# Patient Record
Sex: FEMALE | Race: WHITE | NOT HISPANIC OR LATINO | URBAN - METROPOLITAN AREA
[De-identification: names, ages, dates, MRNs, and addresses within clinical notes are randomized per-mention and may not be internally consistent; named-entity substitution may affect disease eponyms.]

---

## 2023-02-13 ENCOUNTER — OFFICE VISIT (OUTPATIENT)
Dept: FAMILY MEDICINE CLINIC | Facility: CLINIC | Age: 28
End: 2023-02-13

## 2023-02-13 VITALS
BODY MASS INDEX: 19.91 KG/M2 | TEMPERATURE: 98.2 F | WEIGHT: 131.4 LBS | HEART RATE: 55 BPM | OXYGEN SATURATION: 100 % | HEIGHT: 68 IN | SYSTOLIC BLOOD PRESSURE: 119 MMHG | DIASTOLIC BLOOD PRESSURE: 58 MMHG

## 2023-02-13 DIAGNOSIS — Z13.0 SCREENING FOR DEFICIENCY ANEMIA: ICD-10-CM

## 2023-02-13 DIAGNOSIS — Z13.21 ENCOUNTER FOR VITAMIN DEFICIENCY SCREENING: ICD-10-CM

## 2023-02-13 DIAGNOSIS — Z00.00 ANNUAL PHYSICAL EXAM: Primary | ICD-10-CM

## 2023-02-13 DIAGNOSIS — Z13.6 SCREENING FOR CARDIOVASCULAR CONDITION: ICD-10-CM

## 2023-02-13 DIAGNOSIS — Z13.1 SCREENING FOR DIABETES MELLITUS: ICD-10-CM

## 2023-02-13 NOTE — PROGRESS NOTES
[facility] PRIMARY CARE    NAME: Katey Caballero  AGE: 28 y o  SEX: female  : 1995     DATE: 2023     Assessment and Plan:     Problem List Items Addressed This Visit    None  Visit Diagnoses     Annual physical exam    -  Primary    Screening for cardiovascular condition        Relevant Orders    Lipid panel    Screening for diabetes mellitus        Relevant Orders    Comprehensive metabolic panel    Encounter for vitamin deficiency screening        Relevant Orders    Vitamin D 25 hydroxy    Screening for deficiency anemia        Relevant Orders    CBC and differential          Immunizations and preventive care screenings were discussed with patient today  Appropriate education was printed on patient's after visit summary  Counseling:  Alcohol/drug use: discussed moderation in alcohol intake, the recommendations for healthy alcohol use, and avoidance of illicit drug use  Dental Health: discussed importance of regular tooth brushing, flossing, and dental visits  Injury prevention: discussed safety/seat belts, safety helmets, smoke detectors, carbon dioxide detectors, and smoking near bedding or upholstery  Sexual health: discussed sexually transmitted diseases, partner selection, use of condoms, avoidance of unintended pregnancy, and contraceptive alternatives  Exercise: the importance of regular exercise/physical activity was discussed  Recommend exercise 3-5 times per week for at least 30 minutes  No follow-ups on file  Chief Complaint:     Chief Complaint   Patient presents with   • Establish Care     Patient is in the office to Establish Care with Dr Edgardo Arana  No viewable records  Stated concerns - none; new to [place] from [address]  [facility] since 10/2022  Flu shot - completed - 10/2022  PAP - last  -  [address]  - normal findings  Care gap ms to be sent     HIV & HEPC - declined  Lab - pt unsure History of Present Illness:     Adult Annual Physical   Patient here for a comprehensive physical exam  The patient reports no problems  Recently moved to the area from [address]  Is from [facility]  Did undergrad at [employer]  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[facility]  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 working at [employer]  Had labs done in past and cholesterol was reportedly normal  Father has familial hyperlipidemia  Had adacel prior to pharmacy school and had HPV vaccines  Will bring copy of records for me to review  Had pap in 2021  Was reportedly normal  Will obtain records  Diet and Physical Activity  Diet/Nutrition: consuming 3-5 servings of fruits/vegetables daily and does not drink laina  She does not eat red meat at all  Eats fish  Exercise: walks her [place] 5-6 miles per day  she also goes to [employer]  Depression Screening  PHQ-2/9 Depression Screening    Little interest or pleasure in doing things: 0 - not at all  Feeling down, depressed, or hopeless: 0 - not at all  PHQ-2 Score: 0  PHQ-2 Interpretation: Negative depression screen       General Health  Sleep: sleeps well  Hearing: normal - bilateral   Vision: no vision problems  Dental: no dental visits for >1 year  /GYN Health  Last menstrual period: 1/6/223  Her cycles are 37 to 45 days in length  Contraceptive method: condoms    History of STDs?: no      Review of Systems:     Review of Systems   Past Medical History:     Past Medical History:   Diagnosis Date   • PCOS (polycystic ovarian syndrome) 2010      Past Surgical History:     Past Surgical History:   Procedure Laterality Date   • ACHILLES TENDON REPAIR Left 2012   • ARTHROSCOPIC REPAIR ACL Left 2013      Social History:     Social History     Socioeconomic History   • Marital status: /Civil Union     Spouse name: None   • Number of children: None   • Years of education: None   • Highest education level: None   Occupational History   • None   Tobacco Use   • Smoking status: Never   • Smokeless tobacco: Never   Vaping Use   • Vaping Use: Never used   Substance and Sexual Activity   • Alcohol use: Not Currently     Comment: rarely consume alcohol   • Drug use: Never   • Sexual activity: Yes     Partners: Male     Birth control/protection: Condom Male   Other Topics Concern   • None   Social History Narrative        Has a dog  She is currently doing post graduate work at [employer] and working at [employer]  Is [facility] born and did undergrad at [employer], pharmacy school at [address] Strain: Not on file   Food Insecurity: Not on file   Transportation Needs: Not on file   Physical Activity: Not on file   Stress: Not on file   Social Connections: Not on file   Intimate Partner Violence: Not on file   Housing Stability: Not on file      Family History:     Family History   Problem Relation Age of Onset   • Asthma Mother    • COPD Mother    • Hypertension Father    • Hyperlipidemia Father    • Breast cancer Maternal Grandmother    • Breast cancer Paternal Grandmother       Current Medications:     No current outpatient medications on file  No current facility-administered medications for this visit  Allergies:     No Known Allergies   Physical Exam:     /58 (BP Location: Left arm, Patient Position: Sitting, Cuff Size: Standard)   Pulse 55 Comment: WNL per pt  Temp 98 2 °F (36 8 °C) (Tympanic)   Ht 5' 7 5" (1 715 m)   Wt 59 6 kg (131 lb 6 4 oz)   SpO2 100%   BMI 20 28 kg/m²     Physical Exam  Vitals and nursing note reviewed  Constitutional:       General: She is not in acute distress  Appearance: Normal appearance  She is not ill-appearing, toxic-appearing or diaphoretic  HENT:      Head: Normocephalic and atraumatic        Right Ear: Tympanic membrane normal       Left Ear: Tympanic membrane normal       Nose: Nose normal       Mouth/Throat:      Mouth: Mucous membranes are moist       Pharynx: No oropharyngeal exudate or posterior oropharyngeal erythema  Eyes:      Conjunctiva/sclera: Conjunctivae normal       Pupils: Pupils are equal, round, and reactive to light  Cardiovascular:      Rate and Rhythm: Normal rate and regular rhythm  Heart sounds: No murmur heard  Pulmonary:      Effort: Pulmonary effort is normal       Breath sounds: Normal breath sounds  Abdominal:      General: Abdomen is flat  Bowel sounds are normal  There is no distension  Palpations: Abdomen is soft  There is no mass  Tenderness: There is no abdominal tenderness  Musculoskeletal:         General: Normal range of motion  Cervical back: Normal range of motion  Right lower leg: No edema  Left lower leg: No edema  Lymphadenopathy:      Cervical: No cervical adenopathy  Skin:     General: Skin is warm and dry  Findings: No lesion or rash  Neurological:      General: No focal deficit present  Mental Status: She is alert and oriented to person, place, and time     Psychiatric:         Mood and Affect: Mood normal           Olya Contreras DO   [facility] PRIMARY CARE

## 2023-02-13 NOTE — PATIENT INSTRUCTIONS

## 2023-02-14 ENCOUNTER — TELEPHONE (OUTPATIENT)
Dept: ADMINISTRATIVE | Facility: OTHER | Age: 28
End: 2023-02-14

## 2023-02-14 NOTE — LETTER
Procedure Request Form: Cervical Cancer Screening      Date Requested: 02/15/23  Patient: Arjun Isaac  Patient : 1995   Referring Provider: Electa Avinash, DO        Date of Procedure ______________________________       The above patient has informed us that they have completed their   most recent Cervical Cancer Screening at your facility  Please complete   this form and attach all corresponding procedure reports/results  Comments __________________________________________________________  ____________________________________________________________________  ____________________________________________________________________  ____________________________________________________________________    Facility Completing Procedure _________________________________________    Form Completed By (print name) _______________________________________      Signature __________________________________________________________      These reports are needed for  compliance  Please fax this completed form and a copy of the procedure report to our office located at [place] [id number] as soon as possible to Fax [phone] laure Henriquez: Phone [phone]    We thank you for your assistance in treating our mutual patient

## 2023-02-14 NOTE — LETTER
Procedure Request Form: Cervical Cancer Screening      Date Requested: 23  Patient: Jackie Araujoer  Patient : 1995   Referring Provider: Marie Thomas, DO        Date of Procedure ______________________________       The above patient has informed us that they have completed their   most recent Cervical Cancer Screening at your facility  Please complete   this form and attach all corresponding procedure reports/results  Comments __________________________________________________________  ____________________________________________________________________  ____________________________________________________________________  ____________________________________________________________________    Facility Completing Procedure _________________________________________    Form Completed By (print name) _______________________________________      Signature __________________________________________________________      These reports are needed for  compliance  Please fax this completed form and a copy of the procedure report to our office located at [place] [id number] as soon as possible to Fax [phone] laure Isidro : Phone [phone]    We thank you for your assistance in treating our mutual patient

## 2023-02-14 NOTE — TELEPHONE ENCOUNTER
----- Message from Enid Hui sent at 2/13/2023  1:43 PM EST -----  Regarding: care gap request  02/13/23 1:43 PM    Hello, our patient attached above has had Pap Smear (HPV) aka Cervical Cancer Screening completed/performed  Please assist in updating the patient chart by making an External outreach to [address] [place] [address] located in [address]   The date of service is 2021    Thank you,  Enid Hui  [facility] [address]

## 2023-02-14 NOTE — LETTER
Procedure Request Form: Cervical Cancer Screening      Date Requested: 23  Patient: Elmira Tamayo  Patient : 1995   Referring Provider: Deepali Rater, DO        Date of Procedure ______________________________       The above patient has informed us that they have completed their   most recent Cervical Cancer Screening at your facility  Please complete   this form and attach all corresponding procedure reports/results  Comments __________________________________________________________  ____________________________________________________________________  ____________________________________________________________________  ____________________________________________________________________    Facility Completing Procedure _________________________________________    Form Completed By (print name) _______________________________________      Signature __________________________________________________________      These reports are needed for  compliance  Please fax this completed form and a copy of the procedure report to our office located at [place] [phone] as soon as possible to Fax [phone] laure Andrade: Phone [phone]    We thank you for your assistance in treating our mutual patient

## 2023-02-15 NOTE — TELEPHONE ENCOUNTER
Upon review of the In Basket request and the patient's chart, initial outreach has been made via fax to facility  Please see Contacts section for details       Thank you  Mckenzie Vann

## 2023-02-20 NOTE — TELEPHONE ENCOUNTER
As a follow-up, a second attempt has been made for outreach via fax to facility  Please see Contacts section for details      Thank you  Reta Giles

## 2023-02-27 NOTE — TELEPHONE ENCOUNTER
As a final attempt, a third outreach has been made via telephone call to facility  The outcome of the telephone call was a fax request form to be sent to Office/Facility  Please see Contacts section for details  This encounter will be closed and completed by end of day  Should we receive the requested information because of previous outreach attempts, the requested patient's chart will be updated appropriately       Thank you  Reta Giles

## 2023-02-28 ENCOUNTER — APPOINTMENT (OUTPATIENT)
Dept: LAB | Facility: HOSPITAL | Age: 28
End: 2023-02-28

## 2023-02-28 DIAGNOSIS — Z13.0 SCREENING FOR DEFICIENCY ANEMIA: ICD-10-CM

## 2023-02-28 DIAGNOSIS — Z13.6 SCREENING FOR CARDIOVASCULAR CONDITION: ICD-10-CM

## 2023-02-28 DIAGNOSIS — Z13.21 ENCOUNTER FOR VITAMIN DEFICIENCY SCREENING: ICD-10-CM

## 2023-02-28 DIAGNOSIS — Z13.1 SCREENING FOR DIABETES MELLITUS: ICD-10-CM

## 2023-02-28 LAB
25(OH)D3 SERPL-MCNC: 29.2 NG/ML (ref 30–100)
ALBUMIN SERPL BCP-MCNC: 4.2 G/DL (ref 3.5–5)
ALP SERPL-CCNC: 38 U/L (ref 46–116)
ALT SERPL W P-5'-P-CCNC: 18 U/L (ref 12–78)
ANION GAP SERPL CALCULATED.3IONS-SCNC: 3 MMOL/L (ref 4–13)
AST SERPL W P-5'-P-CCNC: 15 U/L (ref 5–45)
BASOPHILS # BLD AUTO: 0.03 THOUSANDS/ÂΜL (ref 0–0.1)
BASOPHILS NFR BLD AUTO: 1 % (ref 0–1)
BILIRUB SERPL-MCNC: 1.08 MG/DL (ref 0.2–1)
BUN SERPL-MCNC: 20 MG/DL (ref 5–25)
CALCIUM SERPL-MCNC: 9.2 MG/DL (ref 8.3–10.1)
CHLORIDE SERPL-SCNC: 107 MMOL/L (ref 96–108)
CHOLEST SERPL-MCNC: 130 MG/DL
CO2 SERPL-SCNC: 27 MMOL/L (ref 21–32)
CREAT SERPL-MCNC: 0.78 MG/DL (ref 0.6–1.3)
EOSINOPHIL # BLD AUTO: 0.07 THOUSAND/ÂΜL (ref 0–0.61)
EOSINOPHIL NFR BLD AUTO: 2 % (ref 0–6)
ERYTHROCYTE [DISTWIDTH] IN BLOOD BY AUTOMATED COUNT: 12 % (ref 11.6–15.1)
GFR SERPL CREATININE-BSD FRML MDRD: 103 ML/MIN/1.73SQ M
GLUCOSE P FAST SERPL-MCNC: 87 MG/DL (ref 65–99)
HCT VFR BLD AUTO: 38.7 % (ref 34.8–46.1)
HDLC SERPL-MCNC: 51 MG/DL
HGB BLD-MCNC: 13.1 G/DL (ref 11.5–15.4)
IMM GRANULOCYTES # BLD AUTO: 0 THOUSAND/UL (ref 0–0.2)
IMM GRANULOCYTES NFR BLD AUTO: 0 % (ref 0–2)
LDLC SERPL CALC-MCNC: 71 MG/DL (ref 0–100)
LYMPHOCYTES # BLD AUTO: 1.47 THOUSANDS/ÂΜL (ref 0.6–4.47)
LYMPHOCYTES NFR BLD AUTO: 38 % (ref 14–44)
MCH RBC QN AUTO: 30 PG (ref 26.8–34.3)
MCHC RBC AUTO-ENTMCNC: 33.9 G/DL (ref 31.4–37.4)
MCV RBC AUTO: 89 FL (ref 82–98)
MONOCYTES # BLD AUTO: 0.35 THOUSAND/ÂΜL (ref 0.17–1.22)
MONOCYTES NFR BLD AUTO: 9 % (ref 4–12)
NEUTROPHILS # BLD AUTO: 2 THOUSANDS/ÂΜL (ref 1.85–7.62)
NEUTS SEG NFR BLD AUTO: 50 % (ref 43–75)
NONHDLC SERPL-MCNC: 79 MG/DL
NRBC BLD AUTO-RTO: 0 /100 WBCS
PLATELET # BLD AUTO: 191 THOUSANDS/UL (ref 149–390)
PMV BLD AUTO: 11 FL (ref 8.9–12.7)
POTASSIUM SERPL-SCNC: 4 MMOL/L (ref 3.5–5.3)
PROT SERPL-MCNC: 7.1 G/DL (ref 6.4–8.4)
RBC # BLD AUTO: 4.36 MILLION/UL (ref 3.81–5.12)
SODIUM SERPL-SCNC: 137 MMOL/L (ref 135–147)
TRIGL SERPL-MCNC: 39 MG/DL
WBC # BLD AUTO: 3.92 THOUSAND/UL (ref 4.31–10.16)

## 2023-04-03 PROBLEM — E55.9 VITAMIN D DEFICIENCY: Status: ACTIVE | Noted: 2023-04-03

## 2024-02-16 NOTE — PROGRESS NOTES
ADULT ANNUAL PHYSICAL  [facility] PRIMARY CARE    NAME: Symone Moy  AGE: 28 y.o. 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 Was to bring copy of these for her chart. Have not received.   Screening labs done last year showed elevated alk phos as well as low vitamin D.   Will check lipid panel and glucose    Leukopenia, unspecified type        Low serum alkaline phosphatase        Relevant Orders    PTH, intact    Vitamin D 25 hydroxy    Magnesium    Vitamin B6    TSH, 3rd generation with Free T4 reflex    Celiac Disease Antibody Profile    Zinc    Phosphorus  Discussed potential etiologies of low alk phos including hypophosphatasia, malnutrition, zinc deficiency,  She has no sx of hypophosphatasia (fracture prior to 17, bone pain, tooth decay, short stature).   Will repeat alk phos and check some additional labs as noted above.     Screening for cervical cancer        Relevant Orders    Ambulatory referral to Obstetrics / Gynecology  Due for pap  Prefers to see gyn to establish care in case she is interested in starting family in near future.     Amenorrhea        Relevant Orders    Pregnancy Test (HCG Qualitative)  Likely related to her PCOS  Check beta hcg      Screening for diabetes mellitus        Relevant Orders    Comprehensive metabolic panel    Screening for cardiovascular condition        Relevant Orders    Lipid panel              Immunizations and preventive care screenings were discussed with patient today. Appropriate education was printed on patient's after visit summary.    Counseling:  Alcohol/drug use: discussed  moderation in alcohol intake, the recommendations for healthy alcohol use, and avoidance of illicit drug use.  Dental Health: discussed importance of regular tooth brushing, flossing, and dental visits.  Injury prevention: discussed safety/seat belts, safety helmets, smoke detectors, carbon dioxide detectors, and smoking near bedding or upholstery.  Sexual health: discussed sexually transmitted diseases, partner selection, use of condoms, avoidance of unintended pregnancy, and contraceptive alternatives.  Exercise: the importance of regular exercise/physical activity was discussed. Recommend exercise 3-5 times per week for at least 30 minutes.          No follow-ups on file.     Chief Complaint:     Chief Complaint   Patient presents with   • Annual Exam     Has not had a menstrual period since December, stated she is not pregnant, she took a pregnancy test, was negative. Pt believes it is due to her being more stress than usual      History of Present Illness:     Adult Annual Physical   Patient here for a comprehensive physical exam.   Her last pap was completed while in [place] in 2021. Never received those records.   Reportedly received her Adacel while in pharmacy school. We never received those records.   Had screening labs at time of last years' PE. Her alk phos was slightly low, vitamin D was slightly low and her wbc was low at 3.92  Repeat labs were never completed.     Her appetite is the same. Ran [employer] in November of 2023. After that took a bit of a break. Has really not got back into her exercise routine.   FDP 12/30/24.   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 Thinks stress is contributing to her missed periods.   She is sexually active. Uses condoms. Did do pregnancy test at home and it was negative.   Overdue for pap and wants to see gyn to establish in case she wants to start a family soon.     ?      The patient reports  stress .    Diet and Physical  Activity  Diet/Nutrition: well balanced diet.   Exercise:  runs, does orange theory and yoga. Has not exercising as much since her marathon .      Depression Screening  PHQ-2/9 Depression Screening    Little interest or pleasure in doing things: 0 - not at all  Feeling down, depressed, or hopeless: 0 - not at all  PHQ-2 Score: 0  PHQ-2 Interpretation: Negative depression screen       General Health  Sleep: sleeps well.   Hearing: normal - bilateral.  Vision: no vision problems.   Dental: regular dental visits.       /GYN Health  Follows with gynecology? no   Last menstrual period: 12/30/23  Contraceptive method:  none .  History of STDs?: no.     Advanced Care Planning  Do you have an advanced directive? no  Do you have a durable medical power of ? no  ACP document given to the patient? no      Review of Systems:     Review of Systems   Past Medical History:     Past Medical History:   Diagnosis Date   • PCOS (polycystic ovarian syndrome) 2010      Past Surgical History:     Past Surgical History:   Procedure Laterality Date   • ACHILLES TENDON REPAIR Left 2012   • ARTHROSCOPIC REPAIR ACL Left 2013      Social History:     Social History     Socioeconomic History   • Marital status: /Civil Union     Spouse name: None   • Number of children: None   • Years of education: None   • Highest education level: None   Occupational History   • None   Tobacco Use   • Smoking status: Never   • Smokeless tobacco: Never   Vaping Use   • Vaping status: Never Used   Substance and Sexual Activity   • Alcohol use: Not Currently     Comment: rarely consume alcohol   • Drug use: Never   • Sexual activity: Yes     Partners: Male     Birth control/protection: Condom Male   Other Topics Concern   • None   Social History Narrative        Has a dog.     She is currently doing post graduate work at [employer] and working at [employer].     Is [place] born and did undergrad at [employer], pharmacy school at [employer]    Graduates may  "2023     Social Determinants of Health     Financial Resource Strain: Not on file   Food Insecurity: Not on file   Transportation Needs: Not on file   Physical Activity: Not on file   Stress: Not on file   Social Connections: Not on file   Intimate Partner Violence: Not on file   Housing Stability: Not on file      Family History:     Family History   Problem Relation Age of Onset   • Asthma Mother    • COPD Mother    • Hypertension Father    • Hyperlipidemia Father    • Breast cancer Maternal Grandmother    • Breast cancer Paternal Grandmother       Current Medications:     No current outpatient medications on file.     No current facility-administered medications for this visit.      Allergies:     No Known Allergies   Physical Exam:     /62 (BP Location: Left arm, Patient Position: Sitting, Cuff Size: Adult)   Pulse 71   Temp 98.4 °F (36.9 °C) (Tympanic)   Ht 5' 7.5\" (1.715 m)   Wt 63.9 kg (140 lb 12.8 oz)   LMP 12/20/2023 (Exact Date) Comment: Not pregnant, took a pregnancy test, was negative.  SpO2 100%   BMI 21.73 kg/m²     Physical Exam  Vitals and nursing note reviewed.   Constitutional:       General: She is not in acute distress.     Appearance: Normal appearance. She is not ill-appearing, toxic-appearing or diaphoretic.   HENT:      Head: Normocephalic and atraumatic.      Right Ear: Tympanic membrane normal.      Left Ear: Tympanic membrane normal.      Nose: Nose normal.      Mouth/Throat:      Mouth: Mucous membranes are moist.      Pharynx: No posterior oropharyngeal erythema.   Eyes:      Extraocular Movements: Extraocular movements intact.      Conjunctiva/sclera: Conjunctivae normal.      Pupils: Pupils are equal, round, and reactive to light.   Cardiovascular:      Rate and Rhythm: Normal rate and regular rhythm.      Heart sounds: No murmur heard.  Pulmonary:      Effort: Pulmonary effort is normal.      Breath sounds: Normal breath sounds.   Abdominal:      General: Abdomen is flat. " Bowel sounds are normal.   Musculoskeletal:      Cervical back: Normal range of motion and neck supple.      Right lower leg: No edema.      Left lower leg: No edema.   Lymphadenopathy:      Cervical: No cervical adenopathy.   Skin:     General: Skin is warm and dry.      Findings: No rash.   Neurological:      General: No focal deficit present.      Mental Status: She is alert and oriented to person, place, and time.   Psychiatric:         Mood and Affect: Mood normal.          Jennifer Doshi,    [facility] PRIMARY [facility]

## 2024-02-19 ENCOUNTER — OFFICE VISIT (OUTPATIENT)
Dept: FAMILY MEDICINE CLINIC | Facility: CLINIC | Age: 29
End: 2024-02-19
Payer: COMMERCIAL

## 2024-02-19 VITALS
HEART RATE: 71 BPM | SYSTOLIC BLOOD PRESSURE: 110 MMHG | WEIGHT: 140.8 LBS | HEIGHT: 68 IN | OXYGEN SATURATION: 100 % | BODY MASS INDEX: 21.34 KG/M2 | TEMPERATURE: 98.4 F | DIASTOLIC BLOOD PRESSURE: 62 MMHG

## 2024-02-19 DIAGNOSIS — R17 ELEVATED BILIRUBIN: ICD-10-CM

## 2024-02-19 DIAGNOSIS — R74.8 LOW SERUM ALKALINE PHOSPHATASE: ICD-10-CM

## 2024-02-19 DIAGNOSIS — Z00.00 ANNUAL PHYSICAL EXAM: Primary | ICD-10-CM

## 2024-02-19 DIAGNOSIS — N91.2 AMENORRHEA: ICD-10-CM

## 2024-02-19 DIAGNOSIS — Z13.6 SCREENING FOR CARDIOVASCULAR CONDITION: ICD-10-CM

## 2024-02-19 DIAGNOSIS — Z12.4 SCREENING FOR CERVICAL CANCER: ICD-10-CM

## 2024-02-19 DIAGNOSIS — E28.2 PCOS (POLYCYSTIC OVARIAN SYNDROME): ICD-10-CM

## 2024-02-19 DIAGNOSIS — Z13.1 SCREENING FOR DIABETES MELLITUS: ICD-10-CM

## 2024-02-19 DIAGNOSIS — E55.9 VITAMIN D DEFICIENCY: ICD-10-CM

## 2024-02-19 DIAGNOSIS — D72.819 LEUKOPENIA, UNSPECIFIED TYPE: ICD-10-CM

## 2024-02-19 PROCEDURE — 99395 PREV VISIT EST AGE 18-39: CPT | Performed by: FAMILY MEDICINE

## 2024-02-19 PROCEDURE — 99213 OFFICE O/P EST LOW 20 MIN: CPT | Performed by: FAMILY MEDICINE

## 2024-02-21 ENCOUNTER — TELEPHONE (OUTPATIENT)
Dept: FAMILY MEDICINE CLINIC | Facility: CLINIC | Age: 29
End: 2024-02-21

## 2024-02-21 ENCOUNTER — APPOINTMENT (OUTPATIENT)
Dept: LAB | Facility: HOSPITAL | Age: 29
End: 2024-02-21
Payer: COMMERCIAL

## 2024-02-21 DIAGNOSIS — N91.2 AMENORRHEA: ICD-10-CM

## 2024-02-21 DIAGNOSIS — R74.8 LOW SERUM ALKALINE PHOSPHATASE: ICD-10-CM

## 2024-02-21 PROBLEM — R17 ELEVATED BILIRUBIN: Status: ACTIVE | Noted: 2024-02-21

## 2024-02-21 LAB
25(OH)D3 SERPL-MCNC: 28.6 NG/ML (ref 30–100)
ALBUMIN SERPL BCP-MCNC: 4.4 G/DL (ref 3.5–5)
ALP SERPL-CCNC: 33 U/L (ref 34–104)
ALT SERPL W P-5'-P-CCNC: 9 U/L (ref 7–52)
ANION GAP SERPL CALCULATED.3IONS-SCNC: 6 MMOL/L
AST SERPL W P-5'-P-CCNC: 14 U/L (ref 13–39)
BASOPHILS # BLD AUTO: 0.03 THOUSANDS/ÂΜL (ref 0–0.1)
BASOPHILS NFR BLD AUTO: 1 % (ref 0–1)
BILIRUB DIRECT SERPL-MCNC: 0.24 MG/DL (ref 0–0.2)
BILIRUB SERPL-MCNC: 1.41 MG/DL (ref 0.2–1)
BUN SERPL-MCNC: 19 MG/DL (ref 5–25)
CALCIUM SERPL-MCNC: 9.2 MG/DL (ref 8.4–10.2)
CHLORIDE SERPL-SCNC: 107 MMOL/L (ref 96–108)
CHOLEST SERPL-MCNC: 146 MG/DL
CO2 SERPL-SCNC: 26 MMOL/L (ref 21–32)
CREAT SERPL-MCNC: 0.72 MG/DL (ref 0.6–1.3)
EOSINOPHIL # BLD AUTO: 0.1 THOUSAND/ÂΜL (ref 0–0.61)
EOSINOPHIL NFR BLD AUTO: 2 % (ref 0–6)
ERYTHROCYTE [DISTWIDTH] IN BLOOD BY AUTOMATED COUNT: 12.4 % (ref 11.6–15.1)
GFR SERPL CREATININE-BSD FRML MDRD: 113 ML/MIN/1.73SQ M
GLUCOSE P FAST SERPL-MCNC: 90 MG/DL (ref 65–99)
HCG SERPL QL: NEGATIVE
HCT VFR BLD AUTO: 39.2 % (ref 34.8–46.1)
HDLC SERPL-MCNC: 48 MG/DL
HGB BLD-MCNC: 12.9 G/DL (ref 11.5–15.4)
IMM GRANULOCYTES # BLD AUTO: 0 THOUSAND/UL (ref 0–0.2)
IMM GRANULOCYTES NFR BLD AUTO: 0 % (ref 0–2)
LDLC SERPL CALC-MCNC: 85 MG/DL (ref 0–100)
LYMPHOCYTES # BLD AUTO: 1.58 THOUSANDS/ÂΜL (ref 0.6–4.47)
LYMPHOCYTES NFR BLD AUTO: 34 % (ref 14–44)
MAGNESIUM SERPL-MCNC: 1.9 MG/DL (ref 1.9–2.7)
MCH RBC QN AUTO: 29.2 PG (ref 26.8–34.3)
MCHC RBC AUTO-ENTMCNC: 32.9 G/DL (ref 31.4–37.4)
MCV RBC AUTO: 89 FL (ref 82–98)
MONOCYTES # BLD AUTO: 0.42 THOUSAND/ÂΜL (ref 0.17–1.22)
MONOCYTES NFR BLD AUTO: 9 % (ref 4–12)
NEUTROPHILS # BLD AUTO: 2.5 THOUSANDS/ÂΜL (ref 1.85–7.62)
NEUTS SEG NFR BLD AUTO: 54 % (ref 43–75)
NONHDLC SERPL-MCNC: 98 MG/DL
NRBC BLD AUTO-RTO: 0 /100 WBCS
PHOSPHATE SERPL-MCNC: 3.2 MG/DL (ref 2.7–4.5)
PLATELET # BLD AUTO: 202 THOUSANDS/UL (ref 149–390)
PMV BLD AUTO: 11.1 FL (ref 8.9–12.7)
POTASSIUM SERPL-SCNC: 3.9 MMOL/L (ref 3.5–5.3)
PROT SERPL-MCNC: 6.9 G/DL (ref 6.4–8.4)
PTH-INTACT SERPL-MCNC: 25.3 PG/ML (ref 12–88)
RBC # BLD AUTO: 4.42 MILLION/UL (ref 3.81–5.12)
SODIUM SERPL-SCNC: 139 MMOL/L (ref 135–147)
TRIGL SERPL-MCNC: 66 MG/DL
TSH SERPL DL<=0.05 MIU/L-ACNC: 1.6 UIU/ML (ref 0.45–4.5)
WBC # BLD AUTO: 4.63 THOUSAND/UL (ref 4.31–10.16)

## 2024-02-21 PROCEDURE — 83735 ASSAY OF MAGNESIUM: CPT | Performed by: FAMILY MEDICINE

## 2024-02-21 PROCEDURE — 82306 VITAMIN D 25 HYDROXY: CPT | Performed by: FAMILY MEDICINE

## 2024-02-21 PROCEDURE — 36415 COLL VENOUS BLD VENIPUNCTURE: CPT | Performed by: FAMILY MEDICINE

## 2024-02-21 PROCEDURE — 82784 ASSAY IGA/IGD/IGG/IGM EACH: CPT | Performed by: FAMILY MEDICINE

## 2024-02-21 PROCEDURE — 80053 COMPREHEN METABOLIC PANEL: CPT | Performed by: FAMILY MEDICINE

## 2024-02-21 PROCEDURE — 82248 BILIRUBIN DIRECT: CPT | Performed by: FAMILY MEDICINE

## 2024-02-21 PROCEDURE — 86231 EMA EACH IG CLASS: CPT | Performed by: FAMILY MEDICINE

## 2024-02-21 PROCEDURE — 84443 ASSAY THYROID STIM HORMONE: CPT | Performed by: FAMILY MEDICINE

## 2024-02-21 PROCEDURE — 84630 ASSAY OF ZINC: CPT | Performed by: FAMILY MEDICINE

## 2024-02-21 PROCEDURE — 86364 TISS TRNSGLTMNASE EA IG CLAS: CPT | Performed by: FAMILY MEDICINE

## 2024-02-21 PROCEDURE — 84703 CHORIONIC GONADOTROPIN ASSAY: CPT | Performed by: FAMILY MEDICINE

## 2024-02-21 PROCEDURE — 84100 ASSAY OF PHOSPHORUS: CPT | Performed by: FAMILY MEDICINE

## 2024-02-21 PROCEDURE — 85025 COMPLETE CBC W/AUTO DIFF WBC: CPT | Performed by: FAMILY MEDICINE

## 2024-02-21 PROCEDURE — 84207 ASSAY OF VITAMIN B-6: CPT | Performed by: FAMILY MEDICINE

## 2024-02-21 PROCEDURE — 80061 LIPID PANEL: CPT | Performed by: FAMILY MEDICINE

## 2024-02-21 PROCEDURE — 86258 DGP ANTIBODY EACH IG CLASS: CPT | Performed by: FAMILY MEDICINE

## 2024-02-21 PROCEDURE — 83970 ASSAY OF PARATHORMONE: CPT | Performed by: FAMILY MEDICINE

## 2024-02-21 NOTE — TELEPHONE ENCOUNTER
Pt aware. Will call the lab and see if we can do an add on. Called lab and they stated that TOTAL BILIRUBIN was already resulted from the CMP and they will add on the DIRECT BILIRUBIN.

## 2024-02-21 NOTE — TELEPHONE ENCOUNTER
----- Message from Jennifer Doshi DO sent at 2/21/2024  1:13 PM EST -----  Reviewed labs that have resulted thus far.   Let patient know that her pregnancy test was negative.   Her repeat alkaline phosphatase remains low.   Her vitamin  D was also low.   Other labs for workup of the low alk phos still pending.   Unrelated is elevation of the bilirubin. Sometimes can be elevated in a benign inherited condition called gilberts. Will have staff call lab to see if they can add on  direct/total bili to look into this more.

## 2024-02-22 LAB
ENDOMYSIUM IGA SER QL: NEGATIVE
GLIADIN PEPTIDE IGA SER-ACNC: 3 UNITS (ref 0–19)
GLIADIN PEPTIDE IGG SER-ACNC: 3 UNITS (ref 0–19)
IGA SERPL-MCNC: 138 MG/DL (ref 87–352)
TTG IGA SER-ACNC: <2 U/ML (ref 0–3)
TTG IGG SER-ACNC: 2 U/ML (ref 0–5)

## 2024-02-22 NOTE — TELEPHONE ENCOUNTER
Pt wrote: Rob England! I saw my labs come in and similar trends to last year with the alk phos/bilirubin/vit D. Just wanted to follow up with you to get your impression and any next steps. Thanks!

## 2024-02-23 DIAGNOSIS — R74.8 LOW SERUM ALKALINE PHOSPHATASE: Primary | ICD-10-CM

## 2024-02-27 LAB — ZINC SERPL-MCNC: 72 UG/DL (ref 44–115)

## 2024-02-28 LAB — VIT B6 SERPL-MCNC: 8.5 UG/L (ref 3.4–65.2)

## 2024-04-02 ENCOUNTER — OFFICE VISIT (OUTPATIENT)
Dept: OBGYN CLINIC | Facility: CLINIC | Age: 29
End: 2024-04-02
Payer: COMMERCIAL

## 2024-04-02 VITALS
WEIGHT: 142.4 LBS | SYSTOLIC BLOOD PRESSURE: 102 MMHG | DIASTOLIC BLOOD PRESSURE: 68 MMHG | BODY MASS INDEX: 21.58 KG/M2 | HEIGHT: 68 IN

## 2024-04-02 DIAGNOSIS — Z01.419 ENCOUNTER FOR ANNUAL ROUTINE GYNECOLOGICAL EXAMINATION: Primary | ICD-10-CM

## 2024-04-02 DIAGNOSIS — Z12.4 SCREENING FOR CERVICAL CANCER: ICD-10-CM

## 2024-04-02 DIAGNOSIS — E28.2 PCOS (POLYCYSTIC OVARIAN SYNDROME): ICD-10-CM

## 2024-04-02 DIAGNOSIS — N91.4 SECONDARY OLIGOMENORRHEA: ICD-10-CM

## 2024-04-02 PROCEDURE — 99385 PREV VISIT NEW AGE 18-39: CPT | Performed by: OBSTETRICS & GYNECOLOGY

## 2024-04-02 PROCEDURE — G0145 SCR C/V CYTO,THINLAYER,RESCR: HCPCS | Performed by: OBSTETRICS & GYNECOLOGY

## 2024-04-02 NOTE — PROGRESS NOTES
Annual Wellness Visit  [facility] - [address], [address], [place] [address]    ASSESSMENT/PLAN: Symone Moy is a 28 y.o.  who presents for annual gynecologic exam.    Encounter for routine gynecologic examination  - Routine well woman exam completed today.  - Cervical Cancer Screening: Current ASCCP Guidelines reviewed. Last Pap:  per pt normal.  Past abnormal pap: none.  Next Pap Due: today.  - HPV Vaccination status: Immunization series complete  - STI screening offered including HIV testing: offered, pt declined  - Contraceptive counseling discussed.  Current contraception: condoms,   - The following were reviewed in today's visit: family planning choices, exercise, and healthy diet    Additional problems addressed during this visit:  1. Encounter for annual routine gynecological examination    2. Screening for cervical cancer  -     Ambulatory referral to Obstetrics / Gynecology  -     Liquid-based pap, screening  -     Molecular Hold Sample    3. PCOS (polycystic ovarian syndrome)  Assessment & Plan:  Per pt report - Dx with PCOS at 15yo - started birth control at that time.  Prior to OCPs periods about 1-2x/year.  Per pt u/s and labs done - U/s confirmed PCOS ovaries and she did have elevated androgen levels.      Periods regular on OCPs, stopped around 24yo to see how periods responded.  Periods currently about every 38-40 days, although during periods of stress can go 90 days between periods.  Not currently interested in pregnancy but will be in a few years.  Wondering about if still has PCOS.      Discussed that given cycles > q35 days, this is c/w oligomenorrhea.  PCOS diagnosed in teens can resolve with time.  Patient currently having spontaneous periods with long cycles.  Offered to check labs and ultrasound to see if still meets criteria for PCOS dx.  Briefly discussed ovulation induction options when wishes to conceive.  Return to review results.        4.  Secondary oligomenorrhea  -     US pelvis complete w transvaginal; Future  -     17-Hydroxyprogesterone; Future  -     DHEA-sulfate; Future  -     Estradiol; Future  -     Follicle stimulating hormone; Future  -     Luteinizing hormone; Future  -     Progesterone; Future  -     Prolactin; Future  -     Testosterone, free, total; Future  -     TSH, 3rd generation with Free T4 reflex; Future        Next visit: 4-6 weeks - f/u results; 1 year Wellness      CC:  Annual Gynecologic Examination    HPI: Symone Moy is a 28 y.o.  who presents for annual gynecologic examination.  Patient presents for GYN Wellness and wishes to discuss prior dx PCOS.      Last pap at PCP  in [place].  Now relocated to this area and decided to have gyn care with gyn.  No h/o abnormal pap.  Completed Gardasil vaccine.    Sexually active, condoms.  No new partners.    Currently - Periods about 38-40 days, which is more regular than in the past.  If very stressed can go up to 90 days between periods.    Dx with PCOS at 13yo - started birth control at that time.  Prior to OCPs periods about 1-2x/year.  Per pt u/s and labs done - U/s confirmed PCOS ovaries and she did have elevated androgen levels.  No testing in many years.    Periods regular on OCPs, stopped around 24yo to see how periods responded.          Gyn History  Patient's last menstrual period was 2024.     Last Pap:  per pt normal    She  reports being sexually active and has had partner(s) who are male. She reports using the following method of birth control/protection: Condom Male.       OB History      Past Medical History:  No date: Anxiety      Comment:  seeing therapist  No date: Gilbert's syndrome      Comment:  possible - dx  by PCP due to high bilirubin  2010: PCOS (polycystic ovarian syndrome)     Past Surgical History:  2012: ACHILLES TENDON REPAIR; Left  2013: ARTHROSCOPIC REPAIR ACL; Left     Family History   Problem Relation Age  "of Onset    Asthma Mother     COPD Mother     Hypertension Father     Hyperlipidemia Father     Breast cancer Maternal Grandmother         postmenopausal    Breast cancer Paternal Grandmother         postmenopausal    Uterine cancer Neg Hx     Ovarian cancer Neg Hx     Colon cancer Neg Hx         Social History     Tobacco Use    Smoking status: Never    Smokeless tobacco: Never   Vaping Use    Vaping status: Never Used   Substance Use Topics    Alcohol use: Not Currently     Comment: rarely consume alcohol    Drug use: Never        No current outpatient medications on file.    She has No Known Allergies..    ROS negative except as noted in HPI    Objective:  /68   Ht 5' 7.5\" (1.715 m)   Wt 64.6 kg (142 lb 6.4 oz)   LMP 02/22/2024 Comment: 38-40 day cycles  Breastfeeding No   BMI 21.97 kg/m²      Physical Exam  Constitutional:       Appearance: Normal appearance.   Chest:   Breasts:     Right: Normal. No mass or tenderness.      Left: Normal. No mass or tenderness.   Abdominal:      Palpations: Abdomen is soft.      Tenderness: There is no abdominal tenderness.   Genitourinary:     General: Normal vulva.      Vagina: No bleeding or lesions.      Cervix: Normal.      Uterus: Normal. Not tender.       Adnexa:         Right: No mass or tenderness.          Left: No mass or tenderness.        Rectum: No external hemorrhoid.   Musculoskeletal:         General: Normal range of motion.   Lymphadenopathy:      Upper Body:      Right upper body: No axillary adenopathy.      Left upper body: No axillary adenopathy.   Neurological:      Mental Status: She is alert and oriented to person, place, and time.   Psychiatric:         Mood and Affect: Mood normal.         Behavior: Behavior normal.         "

## 2024-04-03 NOTE — ASSESSMENT & PLAN NOTE
Per pt report - Dx with PCOS at 15yo - started birth control at that time.  Prior to OCPs periods about 1-2x/year.  Per pt u/s and labs done - U/s confirmed PCOS ovaries and she did have elevated androgen levels.      Periods regular on OCPs, stopped around 22yo to see how periods responded.  Periods currently about every 38-40 days, although during periods of stress can go 90 days between periods.  Not currently interested in pregnancy but will be in a few years.  Wondering about if still has PCOS.      Discussed that given cycles > q35 days, this is c/w oligomenorrhea.  PCOS diagnosed in teens can resolve with time.  Patient currently having spontaneous periods with long cycles.  Offered to check labs and ultrasound to see if still meets criteria for PCOS dx.  Briefly discussed ovulation induction options when wishes to conceive.  Return to review results.

## 2024-04-08 NOTE — PROGRESS NOTES
4/9/2024    Assessment/Plan        Problem List Items Addressed This Visit    None  Visit Diagnoses       Low serum alkaline phosphatase        Relevant Orders    Vitamin C            Assessment/Plan:  Patient is a 28yF with Hx of PCOS, Morton syndrome, anxiety who presents for low VitD and low Alk phos     1) Low Alk phos:- Discussed low alk phos can be transient, permeant or pulsatile. Discussed this could be d/t nutritional deficiency such as vitC def- can check for this, VitD intoxication- ruled out, celiac disease- will check, low ZinC- normal ruled out, Bisphosphonate therapy, Profound hypothyroidism- neg, Cushing disease- neg, hypomagnesemia- normal and so forth which can cause transient reduction in alk phos. Patient has her zinc tested, VitD low but not high, does not appear to be profound hypothyroid or cushing. Given 2x abnormal alk phos concern may have HPP however patient is clinically asymptomatic with no hx of fracture before age 17, no issues with early onset tooth decay or kidney stones and hence discussed despite possibly having this genetic condition would not qualify for tx and hence would not recommend further workup. Discussed that most people are asymptomatic to this and generally most patients do not have any long term complications from it except if develop OP in future use of bisphosphonate therapy may further worsen her Alk phos. For now given lack of treatment options, would not recommend any further follow ups or testing at this time.     RTC PRN    CC: low alk phos     History of Present Illness     HPI: Symone Moy is a 28 y.o. year old female with history of PCOS, Gilbert syndrome, anxiety who was referred to us for VitD def and low Alk phos.    Review of Systems    Historical Information   Past Medical History:   Diagnosis Date    Anxiety     seeing therapist    Gilbert's syndrome     possible - dx 2024 by PCP due to high bilirubin    PCOS (polycystic ovarian syndrome) 2010  "    Past Surgical History:   Procedure Laterality Date    ACHILLES TENDON REPAIR Left 2012    ARTHROSCOPIC REPAIR ACL Left 2013     Social History   Social History     Substance and Sexual Activity   Alcohol Use Not Currently    Comment: rarely consume alcohol     Social History     Substance and Sexual Activity   Drug Use Never     Social History     Tobacco Use   Smoking Status Never   Smokeless Tobacco Never     Family History:   Family History   Problem Relation Age of Onset    Asthma Mother     COPD Mother     Hypertension Father     Hyperlipidemia Father     Breast cancer Maternal Grandmother         postmenopausal    Breast cancer Paternal Grandmother         postmenopausal    Uterine cancer Neg Hx     Ovarian cancer Neg Hx     Colon cancer Neg Hx        Meds/Allergies   No current outpatient medications on file.     No current facility-administered medications for this visit.     No Known Allergies    Objective   Vitals: Blood pressure 110/80, pulse 63, height 5' 7.5\" (1.715 m), weight 64.5 kg (142 lb 3.2 oz), last menstrual period 02/22/2024, not currently breastfeeding.  Invasive Devices       None                 Body mass index is 21.94 kg/m².  /80   Pulse 63   Ht 5' 7.5\" (1.715 m)   Wt 64.5 kg (142 lb 3.2 oz)   LMP 02/22/2024 Comment: 38-40 day cycles  BMI 21.94 kg/m²    Wt Readings from Last 3 Encounters:   04/09/24 64.5 kg (142 lb 3.2 oz)   04/02/24 64.6 kg (142 lb 6.4 oz)   02/19/24 63.9 kg (140 lb 12.8 oz)       GEN: NAD  E/n/m nl facies, hearing intact bilat, tongue midline, lips nl  Eyes: no stare or proptosis, nl lids and conjunctiva, EOMI  Neck: trachea midline, thyroid NT to palpation, nl in size, no nodules or neck masses noted, no cervical LAD  CV; heart reg rate s1s2 nl, no m/r/g appreciated, no ATILIO  Resp: CTAB, good effort  Ab+BS  Neuro: no tremor, 2+ DTRs in BUE  MS: no c/c in digits, moves all 4 ext, nl muscle bulk, gait nl  Skin: warm and dry, no palmar erythema  Ext: no c/c " in digits, no edema bilaterally, 2+ DP and PT pulses bilat, no breaks in skin/ulcers on feet, sensation intact to monofilament testing on plantar surfaces bilat  Psych: nl mood and affect, no gross lapses in memory    Physical Exam    The history was obtained from the review of the chart and from the patient.    Lab Results:       Latest Reference Range & Units 02/28/23 07:57 02/21/24 07:58   ALK PHOS 34 - 104 U/L 38 (L) 33 (L)   (L): Data is abnormally low   Latest Reference Range & Units 02/28/23 07:57 02/21/24 07:58   VITAMIN D, 25-HYDROXY 30.0 - 100.0 ng/mL 29.2 (L) 28.6 (L)   VITAMIN B6 3.4 - 65.2 ug/L  8.5   ZINC 44 - 115 ug/dL  72   (L): Data is abnormally low    Future Appointments   Date Time Provider Department Center   5/15/2024  4:00 PM MD MARY Muse [facility]   8/19/2024 12:20 PM DO VILLA De La Cruz  [facility]   2/24/2025 12:20 PM DO VILLA De La Cruz  [facility]   4/15/2025  5:45 PM MD MARY Muse [facility]

## 2024-04-09 ENCOUNTER — CONSULT (OUTPATIENT)
Dept: ENDOCRINOLOGY | Facility: HOSPITAL | Age: 29
End: 2024-04-09
Payer: COMMERCIAL

## 2024-04-09 VITALS
DIASTOLIC BLOOD PRESSURE: 80 MMHG | HEART RATE: 63 BPM | WEIGHT: 142.2 LBS | BODY MASS INDEX: 21.55 KG/M2 | SYSTOLIC BLOOD PRESSURE: 110 MMHG | HEIGHT: 68 IN

## 2024-04-09 DIAGNOSIS — R74.8 LOW SERUM ALKALINE PHOSPHATASE: ICD-10-CM

## 2024-04-09 PROCEDURE — 99204 OFFICE O/P NEW MOD 45 MIN: CPT | Performed by: STUDENT IN AN ORGANIZED HEALTH CARE EDUCATION/TRAINING PROGRAM

## 2024-04-11 LAB
LAB AP GYN PRIMARY INTERPRETATION: NORMAL
Lab: NORMAL

## 2024-08-07 ENCOUNTER — HOSPITAL ENCOUNTER (OUTPATIENT)
Dept: ULTRASOUND IMAGING | Facility: HOSPITAL | Age: 29
Discharge: HOME | End: 2024-08-07
Attending: OBSTETRICS & GYNECOLOGY
Payer: COMMERCIAL

## 2024-08-07 DIAGNOSIS — N91.4 SECONDARY OLIGOMENORRHEA: ICD-10-CM

## 2024-08-07 PROCEDURE — 76856 US EXAM PELVIC COMPLETE: CPT

## 2024-08-07 PROCEDURE — 76830 TRANSVAGINAL US NON-OB: CPT

## 2024-08-12 ENCOUNTER — APPOINTMENT (OUTPATIENT)
Dept: LAB | Facility: HOSPITAL | Age: 29
End: 2024-08-12
Payer: COMMERCIAL

## 2024-08-12 DIAGNOSIS — R17 ELEVATED BILIRUBIN: ICD-10-CM

## 2024-08-12 DIAGNOSIS — R74.8 LOW SERUM ALKALINE PHOSPHATASE: ICD-10-CM

## 2024-08-12 DIAGNOSIS — N91.4 SECONDARY OLIGOMENORRHEA: ICD-10-CM

## 2024-08-12 LAB
25(OH)D3 SERPL-MCNC: 33.7 NG/ML (ref 30–100)
BILIRUB SERPL-MCNC: 1.58 MG/DL (ref 0.2–1)
ESTRADIOL SERPL-MCNC: 95.8 PG/ML
FSH SERPL-ACNC: 6.1 MIU/ML
GLIADIN PEPTIDE IGA SER-ACNC: 0.5 U/ML
GLIADIN PEPTIDE IGA SER-ACNC: NEGATIVE
GLIADIN PEPTIDE IGG SER-ACNC: 0.4 U/ML
GLIADIN PEPTIDE IGG SER-ACNC: NEGATIVE
IGA SERPL-MCNC: 155 MG/DL (ref 66–433)
LH SERPL-ACNC: 35.4 MIU/ML
PROGEST SERPL-MCNC: 1.56 NG/ML
PROLACTIN SERPL-MCNC: 22.07 NG/ML (ref 3.34–26.72)
PTH-INTACT SERPL-MCNC: 30.7 PG/ML (ref 12–88)
TSH SERPL DL<=0.05 MIU/L-ACNC: 2.05 UIU/ML (ref 0.45–4.5)
TTG IGA SER-ACNC: <0.5 U/ML
TTG IGA SER-ACNC: NEGATIVE
TTG IGG SER-ACNC: <0.8 U/ML
TTG IGG SER-ACNC: NEGATIVE

## 2024-08-12 PROCEDURE — 83970 ASSAY OF PARATHORMONE: CPT

## 2024-08-12 PROCEDURE — 84403 ASSAY OF TOTAL TESTOSTERONE: CPT

## 2024-08-12 PROCEDURE — 82180 ASSAY OF ASCORBIC ACID: CPT

## 2024-08-12 PROCEDURE — 83002 ASSAY OF GONADOTROPIN (LH): CPT

## 2024-08-12 PROCEDURE — 82784 ASSAY IGA/IGD/IGG/IGM EACH: CPT

## 2024-08-12 PROCEDURE — 86258 DGP ANTIBODY EACH IG CLASS: CPT

## 2024-08-12 PROCEDURE — 84146 ASSAY OF PROLACTIN: CPT

## 2024-08-12 PROCEDURE — 86364 TISS TRNSGLTMNASE EA IG CLAS: CPT

## 2024-08-12 PROCEDURE — 82306 VITAMIN D 25 HYDROXY: CPT

## 2024-08-12 PROCEDURE — 86231 EMA EACH IG CLASS: CPT

## 2024-08-12 PROCEDURE — 83498 ASY HYDROXYPROGESTERONE 17-D: CPT

## 2024-08-12 PROCEDURE — 84207 ASSAY OF VITAMIN B-6: CPT

## 2024-08-12 PROCEDURE — 82247 BILIRUBIN TOTAL: CPT

## 2024-08-12 PROCEDURE — 83001 ASSAY OF GONADOTROPIN (FSH): CPT

## 2024-08-12 PROCEDURE — 84443 ASSAY THYROID STIM HORMONE: CPT

## 2024-08-12 PROCEDURE — 82670 ASSAY OF TOTAL ESTRADIOL: CPT

## 2024-08-12 PROCEDURE — 82627 DEHYDROEPIANDROSTERONE: CPT

## 2024-08-12 PROCEDURE — 84402 ASSAY OF FREE TESTOSTERONE: CPT

## 2024-08-12 PROCEDURE — 36415 COLL VENOUS BLD VENIPUNCTURE: CPT

## 2024-08-12 PROCEDURE — 84144 ASSAY OF PROGESTERONE: CPT

## 2024-08-13 LAB
DHEA-S SERPL-MCNC: 269 UG/DL (ref 84.8–378)
ENDOMYSIUM IGA SER QL: NEGATIVE
GLIADIN PEPTIDE IGA SER-ACNC: 3 UNITS (ref 0–19)
GLIADIN PEPTIDE IGG SER-ACNC: 2 UNITS (ref 0–19)
IGA SERPL-MCNC: 138 MG/DL (ref 87–352)
TESTOST FREE SERPL-MCNC: 3.9 PG/ML (ref 0–4.2)
TESTOST SERPL-MCNC: 52 NG/DL (ref 13–71)
TTG IGA SER-ACNC: <2 U/ML (ref 0–3)
TTG IGG SER-ACNC: <2 U/ML (ref 0–5)

## 2024-08-15 LAB — 17OHP SERPL-MCNC: 147 NG/DL

## 2024-08-23 LAB — VIT B6 SERPL-MCNC: 17.9 UG/L (ref 3.4–65.2)

## 2024-08-24 LAB — VIT C SERPL-MCNC: 1 MG/DL (ref 0.4–2)

## 2025-01-23 DIAGNOSIS — Z00.6 ENCOUNTER FOR EXAMINATION FOR NORMAL COMPARISON OR CONTROL IN CLINICAL RESEARCH PROGRAM: ICD-10-CM

## 2025-01-24 ENCOUNTER — APPOINTMENT (OUTPATIENT)
Dept: LAB | Facility: HOSPITAL | Age: 30
End: 2025-01-24

## 2025-01-24 DIAGNOSIS — Z00.6 ENCOUNTER FOR EXAMINATION FOR NORMAL COMPARISON OR CONTROL IN CLINICAL RESEARCH PROGRAM: ICD-10-CM

## 2025-01-24 PROCEDURE — 36415 COLL VENOUS BLD VENIPUNCTURE: CPT

## 2025-02-04 LAB
APOB+LDLR+PCSK9 GENE MUT ANL BLD/T: NOT DETECTED
BRCA1+BRCA2 DEL+DUP + FULL MUT ANL BLD/T: NOT DETECTED
MLH1+MSH2+MSH6+PMS2 GN DEL+DUP+FUL M: NOT DETECTED

## 2025-02-21 NOTE — PATIENT INSTRUCTIONS
"Patient Education     Routine physical for adults   The Basics   Written by the doctors and editors at [facility]   What is a physical? -- A physical is a routine visit, or \"check-up,\" with your doctor. You might also hear it called a \"wellness visit\" or \"preventive visit.\"  During each visit, the doctor will:   Ask about your physical and mental health   Ask about your habits, behaviors, and lifestyle   Do an exam   Give you vaccines if needed   Talk to you about any medicines you take   Give advice about your health   Answer your questions  Getting regular check-ups is an important part of taking care of your health. It can help your doctor find and treat any problems you have. But it's also important for preventing health problems.  A routine physical is different from a \"sick visit.\" A sick visit is when you see a doctor because of a health concern or problem. Since physicals are scheduled ahead of time, you can think about what you want to ask the doctor.  How often should I get a physical? -- It depends on your age and health. In general, for people age 21 years and older:   If you are younger than 50 years, you might be able to get a physical every 3 years.   If you are 50 years or older, your doctor might recommend a physical every year.  If you have an ongoing health condition, like diabetes or high blood pressure, your doctor will probably want to see you more often.  What happens during a physical? -- In general, each visit will include:   Physical exam - The doctor or nurse will check your height, weight, heart rate, and blood pressure. They will also look at your eyes and ears. They will ask about how you are feeling and whether you have any symptoms that bother you.   Medicines - It's a good idea to bring a list of all the medicines you take to each doctor visit. Your doctor will talk to you about your medicines and answer any questions. Tell them if you are having any side effects that bother you. You " "should also tell them if you are having trouble paying for any of your medicines.   Habits and behaviors - This includes:   Your diet   Your exercise habits   Whether you smoke, drink alcohol, or use drugs   Whether you are sexually active   Whether you feel safe at home  Your doctor will talk to you about things you can do to improve your health and lower your risk of health problems. They will also offer help and support. For example, if you want to quit smoking, they can give you advice and might prescribe medicines. If you want to improve your diet or get more physical activity, they can help you with this, too.   Lab tests, if needed - The tests you get will depend on your age and situation. For example, your doctor might want to check your:   Cholesterol   Blood sugar   Iron level   Vaccines - The recommended vaccines will depend on your age, health, and what vaccines you already had. Vaccines are very important because they can prevent certain serious or deadly infections.   Discussion of screening - \"Screening\" means checking for diseases or other health problems before they cause symptoms. Your doctor can recommend screening based on your age, risk, and preferences. This might include tests to check for:   Cancer, such as breast, prostate, cervical, ovarian, colorectal, prostate, lung, or skin cancer   Sexually transmitted infections, such as chlamydia and gonorrhea   Mental health conditions like depression and anxiety  Your doctor will talk to you about the different types of screening tests. They can help you decide which screenings to have. They can also explain what the results might mean.   Answering questions - The physical is a good time to ask the doctor or nurse questions about your health. If needed, they can refer you to other doctors or specialists, too.  Adults older than 65 years often need other care, too. As you get older, your doctor will talk to you about:   How to prevent falling at " home   Hearing or vision tests   Memory testing   How to take your medicines safely   Making sure that you have the help and support you need at home  All topics are updated as new evidence becomes available and our peer review process is complete.  This topic retrieved from [facility] on: May 02, 2024.  Topic 908084 Version 1.0  Release: 32.4.3 - C32.122  © 2024 UpToDate, Inc. and/or its affiliates. All rights reserved.  Consumer Information Use and Disclaimer   Disclaimer: This generalized information is a limited summary of diagnosis, treatment, and/or medication information. It is not meant to be comprehensive and should be used as a tool to help the user understand and/or assess potential diagnostic and treatment options. It does NOT include all information about conditions, treatments, medications, side effects, or risks that may apply to a specific patient. It is not intended to be medical advice or a substitute for the medical advice, diagnosis, or treatment of a health care provider based on the health care provider's examination and assessment of a patient's specific and unique circumstances. Patients must speak with a health care provider for complete information about their health, medical questions, and treatment options, including any risks or benefits regarding use of medications. This information does not endorse any treatments or medications as safe, effective, or approved for treating a specific patient. UpToDate, Inc. and its affiliates disclaim any warranty or liability relating to this information or the use thereof.The use of this information is governed by the Terms of Use, available at https://www.woltersFlaskonuwer.com/en/know/clinical-effectiveness-terms. 2024© UpToDate, Inc. and its affiliates and/or licensors. All rights reserved.  Copyright   © 2024 UpToDate, Inc. and/or its affiliates. All rights reserved.

## 2025-02-21 NOTE — PROGRESS NOTES
Adult Annual Physical  Name: Symone Moy      : 1995      MRN: [id number]  Encounter Provider: Jennifer Doshi DO  Encounter Date: 2025   Encounter department: 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   Has had flu vaccine this season.        Encounter for immunization         Screening for diabetes mellitus    Orders:  •  Hemoglobin A1C  •  Comprehensive metabolic panel    Screening for deficiency anemia    Orders:  •  CBC and differential    Screening for cardiovascular condition    Orders:  •  Lipid panel    Screening for thyroid disorder    Orders:  •  TSH, 3rd generation with Free T4 reflex      Immunizations and preventive care screenings were discussed with patient today. Appropriate education was printed on patient's after visit summary.    Counseling:  Alcohol/drug use: discussed moderation in alcohol intake, the recommendations for healthy alcohol use, and avoidance of illicit drug use.  Dental Health: discussed importance of regular tooth brushing, flossing, and dental visits.  Injury prevention: discussed safety/seat belts, safety helmets, smoke detectors, carbon monoxide detectors, and smoking near bedding or upholstery.  Sexual health: discussed sexually transmitted diseases, partner selection, use of condoms, avoidance of unintended pregnancy, and contraceptive alternatives.  Exercise: the importance of regular exercise/physical activity was discussed. Recommend exercise 3-5 times per week for at least 30 minutes.          History of Present Illness     Adult Annual Physical:  Patient presents for annual physical.     Diet and Physical Activity:  - Diet/Nutrition: well balanced diet.  - Exercise:. ballet and does peloton    Depression Screening:  - PHQ-2 Score: 0    General Health:  - Sleep:  "sleeps well.  - Hearing: normal hearing right ear and normal hearing left ear.  - Vision: no vision problems and wears glasses.    /GYN Health:  - Follows with GYN: yes.   - Menopause: premenopausal.   - Last menstrual cycle: 2/13/2025.     Patient is a 29 year old female with PCOS who is being seen today for annual physical   Cervical cancer screening is up to date (4/2/24)  Had flu vaccine this season. No record of her adacel.   Review of Systems  Medical History Reviewed by provider this encounter:  Tobacco  Allergies  Meds  Problems  Med Hx  Surg Hx  Fam Hx  Soc   Hx    .  No current outpatient medications on file prior to visit.     No current facility-administered medications on file prior to visit.        Objective   /53 (BP Location: Left arm, Patient Position: Sitting, Cuff Size: Standard)   Pulse (!) 49   Temp 97.8 °F (36.6 °C) (Tympanic)   Ht 5' 7.5\" (1.715 m)   Wt 62.3 kg (137 lb 6.4 oz)   SpO2 98%   BMI 21.20 kg/m²     Physical Exam  Vitals and nursing note reviewed.   Constitutional:       General: She is not in acute distress.     Appearance: Normal appearance. She is not ill-appearing, toxic-appearing or diaphoretic.   HENT:      Head: Normocephalic and atraumatic.      Right Ear: Tympanic membrane normal.      Left Ear: Tympanic membrane normal.      Nose: Nose normal.      Mouth/Throat:      Mouth: Mucous membranes are moist.      Pharynx: No posterior oropharyngeal erythema.   Eyes:      Extraocular Movements: Extraocular movements intact.      Conjunctiva/sclera: Conjunctivae normal.      Pupils: Pupils are equal, round, and reactive to light.   Cardiovascular:      Rate and Rhythm: Normal rate and regular rhythm.      Heart sounds: No murmur heard.  Pulmonary:      Effort: Pulmonary effort is normal.      Breath sounds: Normal breath sounds.   Abdominal:      General: Abdomen is flat. Bowel sounds are normal.      Palpations: Abdomen is soft.   Musculoskeletal:      Cervical " back: Normal range of motion and neck supple.      Right lower leg: No edema.      Left lower leg: No edema.   Lymphadenopathy:      Cervical: No cervical adenopathy.   Skin:     General: Skin is warm and dry.      Findings: No rash.   Neurological:      General: No focal deficit present.      Mental Status: She is alert and oriented to person, place, and time.      Deep Tendon Reflexes: Reflexes normal.   Psychiatric:         Mood and Affect: Mood normal.

## 2025-02-24 ENCOUNTER — OFFICE VISIT (OUTPATIENT)
Dept: FAMILY MEDICINE CLINIC | Facility: CLINIC | Age: 30
End: 2025-02-24
Payer: COMMERCIAL

## 2025-02-24 VITALS
BODY MASS INDEX: 20.82 KG/M2 | DIASTOLIC BLOOD PRESSURE: 53 MMHG | HEART RATE: 49 BPM | OXYGEN SATURATION: 98 % | HEIGHT: 68 IN | WEIGHT: 137.4 LBS | TEMPERATURE: 97.8 F | SYSTOLIC BLOOD PRESSURE: 114 MMHG

## 2025-02-24 DIAGNOSIS — Z13.6 SCREENING FOR CARDIOVASCULAR CONDITION: ICD-10-CM

## 2025-02-24 DIAGNOSIS — Z13.1 SCREENING FOR DIABETES MELLITUS: ICD-10-CM

## 2025-02-24 DIAGNOSIS — Z00.00 ANNUAL PHYSICAL EXAM: Primary | ICD-10-CM

## 2025-02-24 DIAGNOSIS — E55.9 VITAMIN D DEFICIENCY: ICD-10-CM

## 2025-02-24 DIAGNOSIS — Z13.0 SCREENING FOR DEFICIENCY ANEMIA: ICD-10-CM

## 2025-02-24 DIAGNOSIS — Z23 ENCOUNTER FOR IMMUNIZATION: ICD-10-CM

## 2025-02-24 DIAGNOSIS — Z13.29 SCREENING FOR THYROID DISORDER: ICD-10-CM

## 2025-02-24 PROCEDURE — 99395 PREV VISIT EST AGE 18-39: CPT | Performed by: FAMILY MEDICINE

## 2025-04-08 ENCOUNTER — ANNUAL EXAM (OUTPATIENT)
Dept: OBGYN CLINIC | Facility: CLINIC | Age: 30
End: 2025-04-08
Payer: COMMERCIAL

## 2025-04-08 VITALS
HEIGHT: 68 IN | BODY MASS INDEX: 20.88 KG/M2 | WEIGHT: 137.8 LBS | SYSTOLIC BLOOD PRESSURE: 114 MMHG | DIASTOLIC BLOOD PRESSURE: 52 MMHG

## 2025-04-08 DIAGNOSIS — Z01.419 ENCOUNTER FOR ANNUAL ROUTINE GYNECOLOGICAL EXAMINATION: Primary | ICD-10-CM

## 2025-04-08 DIAGNOSIS — N91.4 SECONDARY OLIGOMENORRHEA: ICD-10-CM

## 2025-04-08 PROCEDURE — S0612 ANNUAL GYNECOLOGICAL EXAMINA: HCPCS | Performed by: OBSTETRICS & GYNECOLOGY

## 2025-04-08 NOTE — PROGRESS NOTES
Annual Wellness Visit  [facility] - [address], [address], [place] [address]    ASSESSMENT/PLAN: Symone Moy is a 29 y.o.  who presents for annual gynecologic exam.    Assessment & Plan  Encounter for annual routine gynecological examination  - Routine well woman exam completed today.  - Cervical Cancer Screening: Current ASCCP Guidelines reviewed. Last Pap: 2024 normal.  Past abnormal pap: none.  Next Pap Due: 1-2 years.  - HPV Vaccination status: Immunization series complete  - STI screening offered including HIV testing: offered, pt declined  - Contraceptive counseling discussed.  Current contraception: condoms,   - The following were reviewed in today's visit: family planning choices, exercise, and healthy diet       Secondary oligomenorrhea  Patient with periods about every 40 days - discussed this is oligomenorrhea.  Labs and ultrasound do not show PCOS, but given patient's prior dx PCOS and long cycles, she likely is on the PCOS spectrum.  She is getting periods more frequently than every 3 months so no need for Provera.  Only concern is when she is ready to conceive, may be more difficult due to oligomenorrhea and less ovulations in a year.  Reviewed options of ovulation induction through our office vs referral to [facility].  Could try spontaneous conception for a few months but given history would not recommend more than a couple cycles before considering intervention.  Can have [facility] referral at anytime.    She discussed they are not ready to conceive, maybe next year and she will keep all of this in mind.  Gave info for local [facility] for her information in [place].         Next visit: 1 year Wellness      CC:  Annual Gynecologic Examination    HPI: Symone Moy is a 29 y.o.  who presents for annual gynecologic examination.  She denies any breast, urinary or pelvic issues at today's visit.    Periods average about every 40 days, this is consistent for  "patient.        Gyn History  Patient's last menstrual period was 2025.     Last Pap: 2024 was normal    She  reports being sexually active and has had partner(s) who are male. She reports using the following method of birth control/protection: Condom Male.       OB History      Past Medical History:  No date: Anxiety      Comment:  seeing therapist  No date: Gilbert's syndrome      Comment:  possible - dx  by PCP due to high bilirubin  2010: PCOS (polycystic ovarian syndrome)     Past Surgical History:  2012: ACHILLES TENDON REPAIR; Left  2013: ARTHROSCOPIC REPAIR ACL; Left     Family History   Problem Relation Age of Onset    Asthma Mother     COPD Mother     Hypertension Father     Hyperlipidemia Father     Breast cancer Maternal Grandmother         postmenopausal    Breast cancer Paternal Grandmother         postmenopausal    Uterine cancer Neg Hx     Ovarian cancer Neg Hx     Colon cancer Neg Hx         Social History     Tobacco Use    Smoking status: Never    Smokeless tobacco: Never   Vaping Use    Vaping status: Never Used   Substance Use Topics    Alcohol use: Not Currently     Comment: rarely consume alcohol    Drug use: Never        No current outpatient medications on file.    She has no known allergies..    ROS negative except as noted in HPI    Objective:  /52 (BP Location: Left arm, Patient Position: Sitting, Cuff Size: Standard)   Ht 5' 7.5\" (1.715 m)   Wt 62.5 kg (137 lb 12.8 oz)   LMP 2025   BMI 21.26 kg/m²      Physical Exam  Constitutional:       Appearance: Normal appearance.   Chest:   Breasts:     Right: Normal. No mass or tenderness.      Left: Normal. No mass or tenderness.   Abdominal:      Palpations: Abdomen is soft.      Tenderness: There is no abdominal tenderness.   Genitourinary:     General: Normal vulva.      Vagina: No bleeding or lesions.      Cervix: Normal.      Uterus: Normal. Not tender.       Adnexa:         Right: No mass or tenderness. "          Left: No mass or tenderness.        Rectum: No external hemorrhoid.   Musculoskeletal:         General: Normal range of motion.   Lymphadenopathy:      Upper Body:      Right upper body: No axillary adenopathy.      Left upper body: No axillary adenopathy.   Neurological:      Mental Status: She is alert and oriented to person, place, and time.   Psychiatric:         Mood and Affect: Mood normal.         Behavior: Behavior normal.

## 2025-04-08 NOTE — LETTER
2025     Jennifer Doshi DO  [address] [id number]    Patient: Symone Moy   YOB: 1995   Date of Visit: 2025       Dear Dr. Jennifer Doshi, :    Thank you for referring Symone Moy to me for evaluation. Below are my notes for this consultation.    If you have questions, please do not hesitate to call me. I look forward to following your patient along with you.         Sincerely,        Jacqueline Valenzuela MD        CC: No Recipients    Jacqueline Valenzuela MD  2025  5:50 PM  Sign when Signing Visit  Annual Wellness Visit  [facility] - [address], [address], [place] [address]    ASSESSMENT/PLAN: Symone Moy is a 29 y.o.  who presents for annual gynecologic exam.    Assessment & Plan  Encounter for annual routine gynecological examination  - Routine well woman exam completed today.  - Cervical Cancer Screening: Current ASCCP Guidelines reviewed. Last Pap: 2024 normal.  Past abnormal pap: none.  Next Pap Due: 1-2 years.  - HPV Vaccination status: Immunization series complete  - STI screening offered including HIV testing: offered, pt declined  - Contraceptive counseling discussed.  Current contraception: condoms,   - The following were reviewed in today's visit: family planning choices, exercise, and healthy diet       Secondary oligomenorrhea  Patient with periods about every 40 days - discussed this is oligomenorrhea.  Labs and ultrasound do not show PCOS, but given patient's prior dx PCOS and long cycles, she likely is on the PCOS spectrum.  She is getting periods more frequently than every 3 months so no need for Provera.  Only concern is when she is ready to conceive, may be more difficult due to oligomenorrhea and less ovulations in a year.  Reviewed options of ovulation induction through our office vs referral to [facility].  Could try spontaneous conception for a few months but given history would not recommend more  than a couple cycles before considering intervention.  Can have [facility] referral at anytime.    She discussed they are not ready to conceive, maybe next year and she will keep all of this in mind.  Gave info for local [facility] for her information in [place].         Next visit: 1 year Wellness      CC:  Annual Gynecologic Examination    HPI: Symone Moy is a 29 y.o.  who presents for annual gynecologic examination.  She denies any breast, urinary or pelvic issues at today's visit.    Periods average about every 40 days, this is consistent for patient.        Gyn History  Patient's last menstrual period was 2025.     Last Pap: 2024 was normal    She  reports being sexually active and has had partner(s) who are male. She reports using the following method of birth control/protection: Condom Male.       OB History      Past Medical History:  No date: Anxiety      Comment:  seeing therapist  No date: Gilbert's syndrome      Comment:  possible - dx  by PCP due to high bilirubin  2010: PCOS (polycystic ovarian syndrome)     Past Surgical History:  2012: ACHILLES TENDON REPAIR; Left  2013: ARTHROSCOPIC REPAIR ACL; Left     Family History   Problem Relation Age of Onset   • Asthma Mother    • COPD Mother    • Hypertension Father    • Hyperlipidemia Father    • Breast cancer Maternal Grandmother         postmenopausal   • Breast cancer Paternal Grandmother         postmenopausal   • Uterine cancer Neg Hx    • Ovarian cancer Neg Hx    • Colon cancer Neg Hx         Social History     Tobacco Use   • Smoking status: Never   • Smokeless tobacco: Never   Vaping Use   • Vaping status: Never Used   Substance Use Topics   • Alcohol use: Not Currently     Comment: rarely consume alcohol   • Drug use: Never        No current outpatient medications on file.    She has no known allergies..    ROS negative except as noted in HPI    Objective:  /52 (BP Location: Left arm, Patient Position: Sitting, Cuff Size:  "Standard)   Ht 5' 7.5\" (1.715 m)   Wt 62.5 kg (137 lb 12.8 oz)   LMP 03/18/2025   BMI 21.26 kg/m²      Physical Exam  Constitutional:       Appearance: Normal appearance.   Chest:   Breasts:     Right: Normal. No mass or tenderness.      Left: Normal. No mass or tenderness.   Abdominal:      Palpations: Abdomen is soft.      Tenderness: There is no abdominal tenderness.   Genitourinary:     General: Normal vulva.      Vagina: No bleeding or lesions.      Cervix: Normal.      Uterus: Normal. Not tender.       Adnexa:         Right: No mass or tenderness.          Left: No mass or tenderness.        Rectum: No external hemorrhoid.   Musculoskeletal:         General: Normal range of motion.   Lymphadenopathy:      Upper Body:      Right upper body: No axillary adenopathy.      Left upper body: No axillary adenopathy.   Neurological:      Mental Status: She is alert and oriented to person, place, and time.   Psychiatric:         Mood and Affect: Mood normal.         Behavior: Behavior normal.           "

## 2025-04-08 NOTE — PATIENT INSTRUCTIONS
- Maintain healthy weight with BMI ideally between 18-25.    - Eat a healthy diet, including multiple servings of vegetables and fruits, as well as lean protein sources.  - Get at least 150 minutes of moderate aerobic activity or 75 minutes of vigorous aerobic activity a week, or a combination of moderate and vigorous activity.  Greater amounts of exercise will provide an even greater health benefit.   - Ensure diet provides 1200mg of Calcium daily (divided) and 800IU of vitamin D, or take supplements to meet this.  - Safe sex practices recommended.    - Resources - information on birth control and sexually transmitted infections - www.bedsider.org            - information on sexually transmitted infections - www.cdc.gov/std/     Local Infertility Specialists:  - Charbel Trevino, office [facility] Leonor [place]wn, [phone], [email or web address]  - [facility] Britney Llanos, [phone], [email or web address]

## 2025-04-22 ENCOUNTER — APPOINTMENT (OUTPATIENT)
Dept: LAB | Facility: HOSPITAL | Age: 30
End: 2025-04-22
Payer: COMMERCIAL

## 2025-04-22 ENCOUNTER — RESULTS FOLLOW-UP (OUTPATIENT)
Dept: FAMILY MEDICINE CLINIC | Facility: CLINIC | Age: 30
End: 2025-04-22

## 2025-04-22 DIAGNOSIS — E55.9 VITAMIN D DEFICIENCY: Primary | ICD-10-CM

## 2025-04-22 LAB
25(OH)D3 SERPL-MCNC: 23.3 NG/ML (ref 30–100)
ALBUMIN SERPL BCG-MCNC: 4.2 G/DL (ref 3.5–5)
ALP SERPL-CCNC: 37 U/L (ref 34–104)
ALT SERPL W P-5'-P-CCNC: 10 U/L (ref 7–52)
ANION GAP SERPL CALCULATED.3IONS-SCNC: 9 MMOL/L (ref 4–13)
AST SERPL W P-5'-P-CCNC: 15 U/L (ref 13–39)
BASOPHILS # BLD AUTO: 0.02 THOUSANDS/ÂΜL (ref 0–0.1)
BASOPHILS NFR BLD AUTO: 0 % (ref 0–1)
BILIRUB SERPL-MCNC: 1.01 MG/DL (ref 0.2–1)
BUN SERPL-MCNC: 12 MG/DL (ref 5–25)
CALCIUM SERPL-MCNC: 9.1 MG/DL (ref 8.4–10.2)
CHLORIDE SERPL-SCNC: 106 MMOL/L (ref 96–108)
CHOLEST SERPL-MCNC: 131 MG/DL (ref ?–200)
CO2 SERPL-SCNC: 26 MMOL/L (ref 21–32)
CREAT SERPL-MCNC: 0.71 MG/DL (ref 0.6–1.3)
EOSINOPHIL # BLD AUTO: 0.2 THOUSAND/ÂΜL (ref 0–0.61)
EOSINOPHIL NFR BLD AUTO: 4 % (ref 0–6)
ERYTHROCYTE [DISTWIDTH] IN BLOOD BY AUTOMATED COUNT: 12.5 % (ref 11.6–15.1)
EST. AVERAGE GLUCOSE BLD GHB EST-MCNC: 97 MG/DL
GFR SERPL CREATININE-BSD FRML MDRD: 114 ML/MIN/1.73SQ M
GLUCOSE P FAST SERPL-MCNC: 92 MG/DL (ref 65–99)
HBA1C MFR BLD: 5 %
HCT VFR BLD AUTO: 35.9 % (ref 34.8–46.1)
HDLC SERPL-MCNC: 47 MG/DL
HGB BLD-MCNC: 12.4 G/DL (ref 11.5–15.4)
IMM GRANULOCYTES # BLD AUTO: 0.01 THOUSAND/UL (ref 0–0.2)
IMM GRANULOCYTES NFR BLD AUTO: 0 % (ref 0–2)
LDLC SERPL CALC-MCNC: 68 MG/DL (ref 0–100)
LYMPHOCYTES # BLD AUTO: 1.8 THOUSANDS/ÂΜL (ref 0.6–4.47)
LYMPHOCYTES NFR BLD AUTO: 37 % (ref 14–44)
MCH RBC QN AUTO: 30.3 PG (ref 26.8–34.3)
MCHC RBC AUTO-ENTMCNC: 34.5 G/DL (ref 31.4–37.4)
MCV RBC AUTO: 88 FL (ref 82–98)
MONOCYTES # BLD AUTO: 0.42 THOUSAND/ÂΜL (ref 0.17–1.22)
MONOCYTES NFR BLD AUTO: 9 % (ref 4–12)
NEUTROPHILS # BLD AUTO: 2.47 THOUSANDS/ÂΜL (ref 1.85–7.62)
NEUTS SEG NFR BLD AUTO: 50 % (ref 43–75)
NONHDLC SERPL-MCNC: 84 MG/DL
NRBC BLD AUTO-RTO: 0 /100 WBCS
PLATELET # BLD AUTO: 177 THOUSANDS/UL (ref 149–390)
PMV BLD AUTO: 11.2 FL (ref 8.9–12.7)
POTASSIUM SERPL-SCNC: 3.8 MMOL/L (ref 3.5–5.3)
PROT SERPL-MCNC: 6.8 G/DL (ref 6.4–8.4)
RBC # BLD AUTO: 4.09 MILLION/UL (ref 3.81–5.12)
SODIUM SERPL-SCNC: 141 MMOL/L (ref 135–147)
TRIGL SERPL-MCNC: 78 MG/DL (ref ?–150)
TSH SERPL DL<=0.05 MIU/L-ACNC: 1.98 UIU/ML (ref 0.45–4.5)
WBC # BLD AUTO: 4.92 THOUSAND/UL (ref 4.31–10.16)

## 2025-04-22 RX ORDER — ERGOCALCIFEROL 1.25 MG/1
50000 CAPSULE, LIQUID FILLED ORAL WEEKLY
Qty: 4 CAPSULE | Refills: 1 | Status: SHIPPED | OUTPATIENT
Start: 2025-04-22 | End: 2025-06-11

## 2025-06-11 DIAGNOSIS — E55.9 VITAMIN D DEFICIENCY: ICD-10-CM

## 2025-06-11 RX ORDER — ERGOCALCIFEROL 1.25 MG/1
CAPSULE, LIQUID FILLED ORAL
Qty: 4 CAPSULE | Refills: 1 | OUTPATIENT
Start: 2025-06-11